# Patient Record
(demographics unavailable — no encounter records)

---

## 2025-06-09 NOTE — REASON FOR VISIT
[New Patient Visit] : a new patient visit for [Other ___] : [unfilled] [Patient] : patient [Mother] : mother [Father] : father

## 2025-06-13 NOTE — END OF VISIT
[FreeTextEntry3] : i was present with the PA during key portions of the history and exam. I agree with findings and have created the plan as outlined above.  Consented and assented for the childrens oncology group [id number] the project every child protocol for eligibility, biobanking and molecular characterization initiative for germline and somatic testing. A copy of the consent given to the parents.  All questions were answered.  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 Proximity to the spleen will need preoperative vaccination. reassure about bening vs low grade malignancy and the need for active surveillance. in general these tumors are treated with surgery even if they recur but with next gen sequencing will try to identify mutation that would allow for target therapy if necessary. we anticipate that she will do well. copies of all reports including pathology and radiology given to the family.    [Time Spent: ___ minutes] : I have spent [unfilled] minutes of time on the encounter which excludes teaching and separately reported services.

## 2025-06-13 NOTE — HISTORY OF PRESENT ILLNESS
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 Discussed that the first line treatment for this type of tumor is resection. Father and Janina consented to [facility]PEC.  [de-identified] : Janina was diagnosed with a solid pseudopapillary neoplasm of the pancreas in June of 2025 at the age of 15.   Janina presented at the age of 15 in May 2025 with known scoliosis after she fell on her left side and developed vomiting and left flank pain. Went to urgent care but was then sent to [facility] were a CT demonstrated a large 10 cm mass in the left upper quadrant possibly the pancreas. MR abdomen (5/16) showed a LUQ 11.6 cm solid enhancing mass lesion arising from the pancreatic body/tail concerning for solid pseudopapillary epithelial neoplasm. Amylase elevated at 136 rest of the labs were normal except mild anemia with hemoglobin on 10.6. she underwent EUS and pathology diagnostic for solid pseudopapillary neoplasm of the pancreas   DIAGNOSIS: Pseudopapillary neoplasm of the pancreas BIOLOGY: STAGING: non-metastatic RISK STRATIFICATION: low risk PROTOCOL: N/A ENROLLED: [id number] TREATMENT STARTED: TREATMENT COMPLETED: RADIATION: SURGERY: CENTRAL LINES: ANTHRACYCLINE TOTAL DOSE: TREATMENT COMPLICATIONS:

## 2025-06-13 NOTE — HISTORY OF PRESENT ILLNESS
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 Discussed that the first line treatment for this type of tumor is resection. Father and Janina consented to APEC.  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   Janina presented at the age of 15 in May 2025 with known scoliosis after she fell on her left side and developed vomiting and left flank pain. Went to urgent care but was then sent to [facility] were a CT demonstrated a large 10 cm mass in the left upper quadrant possibly the pancreas. MR abdomen (5/16) showed a LUQ 11.6 cm solid enhancing mass lesion arising from the pancreatic body/tail concerning for solid pseudopapillary epithelial neoplasm. Amylase elevated at 136 rest of the labs were normal except mild anemia with hemoglobin on 10.6. she underwent EUS and pathology diagnostic for solid pseudopapillary neoplasm of the pancreas   DIAGNOSIS: Pseudopapillary neoplasm of the pancreas BIOLOGY: STAGING: non-metastatic RISK STRATIFICATION: low risk PROTOCOL: N/A ENROLLED: [id number] TREATMENT STARTED: TREATMENT COMPLETED: RADIATION: SURGERY: CENTRAL LINES: ANTHRACYCLINE TOTAL DOSE: TREATMENT COMPLICATIONS:

## 2025-06-13 NOTE — CONSULT LETTER
[Dear  ___] : Dear  [unfilled], [Courtesy Letter:] : I had the pleasure of seeing your patient, [unfilled], in my office today. [Please see my note below.] : Please see my note below. [Sincerely,] : Sincerely, [Consult Letter:] : I had the pleasure of evaluating your patient, [unfilled]. [FreeTextEntry2] : Daryl Copeland [FreeTextEntry3] : Vilma Sanford PA-C Physician Assistant Pediatric Hematology/Oncology and Cellular Therapy [facility] Phone: [phone]  Fidelina Birmingham MD Head, Pediatric Oncology Solid Tumor Program [facility]  of Pediatrics [facility] at [facility] [email or web address]

## 2025-06-13 NOTE — END OF VISIT
[FreeTextEntry3] : i was present with the PA during key portions of the history and exam. I agree with findings and have created the plan as outlined above.  Consented and assented for the childrens oncology group [id number] the project every child protocol for eligibility, biobanking and molecular characterization initiative for germline and somatic testing. A copy of the consent given to the parents.  All questions were answered.  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 Proximity to the spleen will need preoperative vaccination. reassure about bening vs low grade malignancy and the need for active surveillance. in general these tumors are treated with surgery even if they recur but with next gen sequencing will try to identify mutation that would allow for target therapy if necessary. we anticipate that she will do well. copies of all reports including pathology and radiology given to the family.    [Time Spent: ___ minutes] : I have spent [unfilled] minutes of time on the encounter which excludes teaching and separately reported services.

## 2025-06-13 NOTE — CONSULT LETTER
[Dear  ___] : Dear  [unfilled], [Courtesy Letter:] : I had the pleasure of seeing your patient, [unfilled], in my office today. [Please see my note below.] : Please see my note below. [Sincerely,] : Sincerely, [Consult Letter:] : I had the pleasure of evaluating your patient, [unfilled]. [FreeTextEntry2] : Daryl Copeland [FreeTextEntry3] : Vilma Sanford PA-C Physician Assistant Pediatric Hematology/Oncology and Cellular Therapy [facility] Phone: [phone]  Fidelina Birmingham MD Head, Pediatric Oncology Solid Tumor Program [facility]  of Pediatrics [facility] at [facility] [email or web address]

## 2025-06-13 NOTE — PAST MEDICAL HISTORY
[At ___ Weeks Gestation] : at [unfilled] weeks gestation [[place] : [place] [ Section] : by  section [None] : there were no delivery complications [Age Appropriate] : age appropriate  [Pre-menarchal] : pre-menarchal [In Vitro Fertilization] : Pregnancy no in vitro fertilization

## 2025-06-13 NOTE — PHYSICAL EXAM
[Thin] : thin [4] : was Jose stage 4 [Normal for Age] : was normal for age [Normal] : no adenopathy appreciated [Scoliosis] : scoliosis [100: Fully active, normal.] : 100: Fully active, normal.

## 2025-06-20 NOTE — ASSESSMENT
[TextEntry] :  Requires vaccination prior to splenectomy.  Surgical resection currently scheduled for 7/14/2025:  Patient is up to date for her routine vaccination schedule.  Prior to splenectomy it is recommended to further optimize vaccination against encapsulated organisms, and namely for HiB, Strep pneumoniae, and Neisseria meningitidis. She is fully vaccinated for HiB, and does not require any booster. She should receive updated pneumococcal vaccination with PCV-20. She should receive an additional dose of Menactra ACWY and a full 3 dose series for Men B  Time 0, as soon as possible and 14 days or more before splenectomy: -PCV-20 and Men B.   OK to administer on the same day but in two different injection sites.  Time 1 month: -Menactra ACWY and Men B.  OK to administer on the same day but in two different injection sites.  Time 6 months: -Men B.  Please contact me with any questions about these vaccine recommendations.  Jacy Merrill MD, MS Attending - Infectious Disease [facility] Phone [phone] Fax: [phone]    Pediatric office has both Bexsero and Myaa.  Either brand is acceptable. Whichever brand is used, the same brand is then required for all three doses.

## 2025-06-20 NOTE — HISTORY OF PRESENT ILLNESS
[FreeTextEntry2] :  Janina Goode is a 15 yo girl who is referred to Infectious Disease by Oncology prior to planned surgical resection that may include splenectomy.  Per Oncology Note: Janina ... was diagnosed at in June of 2025 with a solid pseudopapillary neoplasm of the pancreas (SPN) We discussed that these tumors are rare pancreatic neoplasms that usually are located in the body or tail of the pancreas. The are often characterized as benign due to lack of metastatic potential and indolent course or as a low grade malignancy with a risk of local recurrence and do not usually metastasize although there are reports of liver mets. The treatment is primarily surgical and has an excellent prognosis with active surveillance only. We discussed the importance of complete resection with negative margins and the proximity to the spleen may require splenectomy so that we would check with infectious disease regarding immunizations prior to splenectomy.  HPI:   Jnaina has a palpable LUQ mass but otherwise feels well. Per parents she is "up to date" on her immunizations, and they have agreed to all vaccinations offered. They shared the immunization record from their EMR portal from their pediatrician with me, and it is now scanned in under "Outside Medical Records" with today's date 6/17/2025.  Relevant vaccination history: HiB vaccine:  1/3/2010, 3/6/2010, 7/31/2010, 2/5/2011 PCV 13 vaccine: 1/3/2010, 3/6/2010, 5/15/2010, 5/1/2011 Meningococcal vaccine:  Menactra ACYW 11/25/2020

## 2025-06-20 NOTE — PHYSICAL EXAM
[TextEntry] : General:  awake, alert, NAD, appears well and appropriate for age HEENT:  eyes clear without injection or discharge, nares clear without discharge Lymph:  no cervical or axillary lymphadenopathy CV: RRR, no mrg Resp: CTAB without increased work of breathing Abdomen: normoactive bowel sounds, soft, non tender palpable mass LUQ Extr: warm and well perfused Skin: no rashes

## 2025-06-20 NOTE — CONSULT LETTER
[Dear  ___] : Dear  [unfilled], [Courtesy Letter:] : I had the pleasure of seeing your patient, [unfilled], in my office today. [Please see my note below.] : Please see my note below. [Sincerely,] : Sincerely, [FreeTextEntry3] : Jacy Merrill MD, MS Attending - Infectious Disease [facility] Phone [phone] Fax: [phone]

## 2025-07-04 NOTE — ASSESSMENT
[FreeTextEntry1] : Janina is a 15 year old female with a SPEN of the distal pancreas.  The mass was found to be heterogeneously enhancing and measured 9 x 9.8 x 11.7 cm arising from the pancreatic body/tail. There are central areas of  decreased enhancement which may represent necrosis. There is mass effect on the intra-abdominal organs, in particular the stomach which is displaced medially.  The splenic artery and vein are draped along the anterior aspect of the mass.  The medial aspect of the mass abuts the aorta, celiac axis and superior  mesenteric arteries which are patent. It also abuts the superior mesenteric vein which appears patent. The posterior aspect of the mass abuts the left renal artery and vein. The left renal vein is narrowed but patent. There is evidence of metastatic disease in the chest, abdomen and pelvis I again reviewed the diagnosis of SPEN and reviewed the indications, risks, benefits and alternatives of her upcoming distal pancreatectomy.  I reviewed the procedure in detail including the possibility for splenectomy given the proximity of the mass to the spleen itself as well as the splenic vessels.  The risks discussed included but were not limited to bleeding, infection, injury to adjacent/intra-abdominal contents such as surrounding organs and/or vital blood vessels that may require repair and/or reconstruction, incomplete resection, etc.  The possibility and implications of a pancreatic leak/fistula were discussed.  The short- and long-term implications of the pancreatectomy itself given the size of the mass were discussed including the possibility of endocrine and/or exocrine dysfunction such as diabetes, digestive concerns, weight loss, need for pancreatic enzymes, etc.  I reviewed postoperative expectations including the likelihood of postoperative ICU stay, prolonged hospitalization, possible need for supplemental nutrition, etc.  I reviewed the possibility and implications of a splenectomy including the role for ongoing antibiotics and the risk of infectious complications.  I reviewed the postoperative activity restrictions and lifestyle changes in the postoperative period.  Mom and dad demonstrate understanding.  We are currently scheduled to proceed on July 14th with Imani Orellana and Ana.  Mom and dad know to contact me prior to the procedure with any further questions or concerns.

## 2025-07-04 NOTE — HISTORY OF PRESENT ILLNESS
[FreeTextEntry1] : Janina is a 15 year old female with a distal pancreatic mass found to be a SPEN.  She is here today with her parents to discuss the upcoming surgical resection.  Janina has been doing well without any complaints since hospital discharge.  She was seen by ID and has gotten her vaccinations for possible splenectomy.

## 2025-07-04 NOTE — CONSULT LETTER
[Dear  ___] : Dear  [unfilled], [Consult Letter:] : I had the pleasure of evaluating your patient, [unfilled]. [Consult Closing:] : Thank you very much for allowing me to participate in the care of this patient.  If you have any questions, please do not hesitate to contact me. [Sincerely,] : Sincerely, [FreeTextEntry2] : Dr Tracey Patrick [address] [place] [address] Phone:  [phone] [FreeTextEntry3] : Marquis Gross MD Division of Pediatric Surgery [facility]

## 2025-07-04 NOTE — CONSULT LETTER
[Dear  ___] : Dear  [unfilled], [Consult Letter:] : I had the pleasure of evaluating your patient, [unfilled]. [Consult Closing:] : Thank you very much for allowing me to participate in the care of this patient.  If you have any questions, please do not hesitate to contact me. [Sincerely,] : Sincerely, [FreeTextEntry2] : Dr Tracey Patrick [address] [place] [address] Phone:  [phone] [FreeTextEntry3] : Marquis Gross MD Division of Pediatric Surgery [facility]

## 2025-07-04 NOTE — REASON FOR VISIT
[Follow-up - Scheduled] : a follow-up, scheduled visit for [Home] : at home, [unfilled] , at the time of the visit. [Medical Office: ([facility])___] : at the medical office located in  [Telehealth (audio & video)] : This visit was provided via telehealth using real-time 2-way audio visual technology. [FreeTextEntry3] : Mother [Other: ____] : [unfilled] [FreeTextEntry4] : Dr Tracey Patrick [Patient] : patient [Parents] : parents

## 2025-07-04 NOTE — REASON FOR VISIT
[Follow-up - Scheduled] : a follow-up, scheduled visit for [Home] : at home, [unfilled] , at the time of the visit. [Medical Office: ([facility])___] : at the medical office located in  [Telehealth (audio & video)] : This visit was provided via telehealth using real-time 2-way audio visual technology. [FreeTextEntry3] : Mother [Other: ____] : [unfilled] [FreeTextEntry4] : Dr Tracey Patrick [Patient] : patient [Parents] : parents